# Patient Record
Sex: FEMALE | Race: WHITE | NOT HISPANIC OR LATINO | Employment: OTHER | URBAN - METROPOLITAN AREA
[De-identification: names, ages, dates, MRNs, and addresses within clinical notes are randomized per-mention and may not be internally consistent; named-entity substitution may affect disease eponyms.]

---

## 2021-08-31 ENCOUNTER — APPOINTMENT (OUTPATIENT)
Dept: RADIOLOGY | Facility: CLINIC | Age: 86
End: 2021-08-31
Payer: MEDICARE

## 2021-08-31 ENCOUNTER — OFFICE VISIT (OUTPATIENT)
Dept: OBGYN CLINIC | Facility: CLINIC | Age: 86
End: 2021-08-31
Payer: MEDICARE

## 2021-08-31 VITALS
HEIGHT: 63 IN | WEIGHT: 115 LBS | BODY MASS INDEX: 20.38 KG/M2 | HEART RATE: 134 BPM | DIASTOLIC BLOOD PRESSURE: 74 MMHG | SYSTOLIC BLOOD PRESSURE: 117 MMHG

## 2021-08-31 DIAGNOSIS — M54.50 LUMBAR PAIN: Primary | ICD-10-CM

## 2021-08-31 DIAGNOSIS — M54.50 LUMBAR PAIN: ICD-10-CM

## 2021-08-31 PROCEDURE — 72110 X-RAY EXAM L-2 SPINE 4/>VWS: CPT

## 2021-08-31 PROCEDURE — 99204 OFFICE O/P NEW MOD 45 MIN: CPT | Performed by: ORTHOPAEDIC SURGERY

## 2021-08-31 RX ORDER — ATORVASTATIN CALCIUM 80 MG/1
80 TABLET, FILM COATED ORAL DAILY
COMMUNITY

## 2021-08-31 NOTE — PROGRESS NOTES
[address]ac[address] Hudson MD  [address] [place] [address] Jaki Bukcley [id number]  [phone]    HISTORY OF PRESENT ILLNESS:      An 56-year-old female presents today with her 2 daughters for initial evaluation of an L1 compression fracture  The family believes that the fracture occurred on August 1st when she experienced a fall and immediately complained of severe low back pain  The patient denies any past history of spinal fracture  The patient was seen by her family doctor a couple days later who recommended the patient be seen at the ER for further imaging  At the ER, a CT scan showed L1 compression fracture  Today the patient states that her pain has improved, however she continues to experience mid to low back pain that worsens with ambulation and when lying down at night  She denies any numbness or tingling in lower extremities  The patient's daughter state that prior to the fall, the patient was able to ambulate without the use of a walker or assistive device  However, since the fall she has had to use a walker and has not "has not been herself"  The patient has been taking tramadol for her pain, but the patient complains that it "blocks her up"  The patient has been taking MiraLax, but it has not helped with the constipation  ALLERGIES: No Known Allergies    MEDICATIONS:    Current Outpatient Medications:     atorvastatin (LIPITOR) 80 mg tablet, Take 80 mg by mouth daily, Disp: , Rfl:     pseudoephedrine-acetaminophen (TYLENOL SINUS)  MG TABS, Take 1 tablet by mouth every 4 (four) hours as needed for congestion, Disp: , Rfl:      PAST MEDICAL HISTORY:   History reviewed  No pertinent past medical history  PAST SURGICAL HISTORY:  History reviewed  No pertinent surgical history  SOCIAL HISTORY:  Social History     ROS:  Review of Systems   Constitutional: Negative for chills and fever  HENT: Negative for ear pain and sore throat      Eyes: Negative for pain and visual disturbance  Respiratory: Negative for cough and shortness of breath  Cardiovascular: Negative for chest pain and palpitations  Gastrointestinal: Negative for abdominal pain and vomiting  Genitourinary: Negative for dysuria and hematuria  Musculoskeletal: Positive for back pain (MId- low back pain) and gait problem  Negative for arthralgias  Skin: Negative for color change and rash  Neurological: Positive for weakness  Negative for seizures, syncope and numbness  All other systems reviewed and are negative  PHYSICAL EXAM:    Pleasant 56-year-old female sitting in chair in no acute distress  She is alert and oriented x4  Patient is able to stand with the assistance of her wheeled walker  The patient is able to ambulate with a shuffling gait  She is unable to walk on her toes or heels without losing balance  There is no palpable pain along the lumbar spine  The patient is able to perform straight leg raise bilaterally  Sensation is intact to light touch along the DP/SP/SA/U/T nerve distributions bilaterally  DTR symmetrical        RADIOGRAPHIC STUDIES:   1  CT scan, L-spine, 8/3/2021:  L1 compression fracture with mild vertebral plana  Fracture line will could not be fully visualized  Mild degenerative disc disease throughout the lumbar spine and the lower thoracic spine  2  X-ray, L-spine, 8/31/2021:   L1 compression fracture with significant wedging  No evidence of instability at any of the levels of the lumbar spine  Mild degenerative changes at other levels  ASSESSMENT:  Problem List Items Addressed This Visit     None      Visit Diagnoses     Lumbar pain    -  Primary    Relevant Orders    XR spine lumbar minimum 4 views non injury    MRI lumbar spine wo contrast            PLAN:    56-year-old female presents office today for initial evaluation of an L1 compression fracture, noted by the patient and her family to have been sustained on 08/01/2021 after a fall    CT scan taken at the ER and x-rays of the lumbar spine taken today office were reviewed and discussed with the patient  On the images it appears as though the L1 fracture is older than the patient believes  It was discussed that if the compression fracture appeared to be new, a kyphoplasty would be indicated  However, fracture appears old with no palpable tenderness on exam   At this time treatment includes aqua therapy and further assessment of the fracture with an MRI  The patient expressed her concern with her ability to do up with therapy, as [place] [address] [facility]  The patient's daughter does admits that they have a neighbor with an indoor pool, and the daughter herself works in physical therapy and is willing to work with the patient  Dr Alma Zimmerman is acceptable to this plan  A lumbar MRI was also ordered today for further evaluation of the compression fracture  The patient was advised to stop taking the tramadol, and instructed to ask her primary care physician about the safety of taking Tylenol instead  The patient should stay away from NSAIDs due to her age  The family will contact me once the MRI study is completed  In the meanwhile she will continue with the pool exercises  Her daughter is a physical therapist and they have access to [employer]  The friend owns a swimming pool  At time examination was not convinced that she had is suffering from acute low back pain due to fracture  There was also point tenderness at the side with the fracture  If the fracture is acute, kyphoplasty will be recommended         Scribe Attestation    I,:  Alex Nagel PA-C am acting as a scribe while in the presence of the attending physician :       I,:  Ascencion Rico MD personally performed the services described in this documentation    as scribed in my presence :

## 2021-09-08 ENCOUNTER — TELEPHONE (OUTPATIENT)
Dept: OBGYN CLINIC | Facility: MEDICAL CENTER | Age: 86
End: 2021-09-08

## 2021-09-08 NOTE — TELEPHONE ENCOUNTER
Patient sees Dr Kelly Vizcarra at [facility] requesting the script for Lumbar spine dated 8/31/2021, please fax to Lori Vizcarra at [phone]      thanks